# Patient Record
Sex: FEMALE
[De-identification: names, ages, dates, MRNs, and addresses within clinical notes are randomized per-mention and may not be internally consistent; named-entity substitution may affect disease eponyms.]

---

## 2017-03-10 ENCOUNTER — RX ONLY (RX ONLY)
Age: 32
End: 2017-03-10

## 2017-03-10 RX ORDER — MOMETASONE FUROATE 1 MG/G
CREAM TOPICAL
Qty: 1 | Refills: 0 | Status: ERX | COMMUNITY
Start: 2017-03-10

## 2017-04-10 ENCOUNTER — RX ONLY (RX ONLY)
Age: 32
End: 2017-04-10

## 2017-04-10 ENCOUNTER — APPOINTMENT (OUTPATIENT)
Dept: URBAN - METROPOLITAN AREA CLINIC 237 | Age: 32
Setting detail: DERMATOLOGY
End: 2017-04-11

## 2017-04-10 DIAGNOSIS — L70.0 ACNE VULGARIS: ICD-10-CM

## 2017-04-10 DIAGNOSIS — L20.89 OTHER ATOPIC DERMATITIS: ICD-10-CM

## 2017-04-10 DIAGNOSIS — L90.6 STRIAE ATROPHICAE: ICD-10-CM

## 2017-04-10 PROBLEM — L30.9 DERMATITIS, UNSPECIFIED: Status: ACTIVE | Noted: 2017-04-10

## 2017-04-10 PROCEDURE — OTHER PRESCRIPTION: OTHER

## 2017-04-10 PROCEDURE — OTHER MIPS QUALITY: OTHER

## 2017-04-10 PROCEDURE — 99213 OFFICE O/P EST LOW 20 MIN: CPT

## 2017-04-10 PROCEDURE — OTHER PATIENT SPECIFIC COUNSELING: OTHER

## 2017-04-10 PROCEDURE — OTHER TREATMENT REGIMEN: OTHER

## 2017-04-10 RX ORDER — SULFACETAMIDE SODIUM, SULFUR 95; 50 MG/G; MG/G
CLOTH TOPICAL
Qty: 1 | Refills: 1 | Status: ERX | COMMUNITY
Start: 2017-04-10

## 2017-04-10 RX ORDER — SULFACETAMIDE SODIUM, SULFUR 95; 50 MG/G; MG/G
CLOTH TOPICAL
Qty: 1 | Refills: 0 | Status: CANCELLED
Stop reason: SDUPTHER

## 2017-04-10 ASSESSMENT — LOCATION ZONE DERM
LOCATION ZONE: HAND
LOCATION ZONE: FACE
LOCATION ZONE: TRUNK

## 2017-04-10 ASSESSMENT — LOCATION SIMPLE DESCRIPTION DERM
LOCATION SIMPLE: LEFT HAND
LOCATION SIMPLE: RIGHT HAND
LOCATION SIMPLE: RIGHT UPPER BACK
LOCATION SIMPLE: LEFT CHEEK
LOCATION SIMPLE: CHEST

## 2017-04-10 ASSESSMENT — LOCATION DETAILED DESCRIPTION DERM
LOCATION DETAILED: RIGHT ULNAR PALM
LOCATION DETAILED: LEFT ULNAR PALM
LOCATION DETAILED: RIGHT SUPERIOR UPPER BACK
LOCATION DETAILED: UPPER STERNUM
LOCATION DETAILED: LEFT SUPERIOR MEDIAL BUCCAL CHEEK

## 2017-04-10 NOTE — PROCEDURE: PATIENT SPECIFIC COUNSELING
Detail Level: Simple
MD clarifies that Pt did not call for Rx and therefore did not have enough medication. Pt prefers Avar due to smell of product and MD will send Rx and advises that she should use QD.
MD advises to continue use of Bio-oil as it makes her skin feel better but she still has stretch marks and cellulite.

## 2017-04-10 NOTE — PROCEDURE: MIPS QUALITY
Quality 131: Pain Assessment And Follow-Up: Pain assessment using a standardized tool is documented as negative, no follow-up plan required
Detail Level: Detailed

## 2017-04-10 NOTE — PROCEDURE: TREATMENT REGIMEN
Detail Level: Simple
Continue Regimen: Bio-Oil apply to affected areas QD
Continue Regimen: Avar cleansing pad. QD
Continue Regimen: Elocon cream QD -BID PRN\\nContinue use of gloves
Detail Level: Zone

## 2017-04-11 ENCOUNTER — RX ONLY (RX ONLY)
Age: 32
End: 2017-04-11

## 2017-04-11 RX ORDER — SULFACETAMIDE SODIUM, SULFUR 95; 50 MG/G; MG/G
CLOTH TOPICAL
Qty: 1 | Refills: 1 | Status: ERX

## 2017-05-15 ENCOUNTER — APPOINTMENT (OUTPATIENT)
Dept: URBAN - METROPOLITAN AREA CLINIC 237 | Age: 32
Setting detail: DERMATOLOGY
End: 2017-05-20

## 2017-05-15 DIAGNOSIS — Z02.9 ENCOUNTER FOR ADMINISTRATIVE EXAMINATIONS, UNSPECIFIED: ICD-10-CM

## 2017-05-15 PROCEDURE — OTHER NO SHOW PLAN: OTHER
